# Patient Record
Sex: MALE | Race: WHITE
[De-identification: names, ages, dates, MRNs, and addresses within clinical notes are randomized per-mention and may not be internally consistent; named-entity substitution may affect disease eponyms.]

---

## 2019-12-07 ENCOUNTER — HOSPITAL ENCOUNTER (EMERGENCY)
Dept: HOSPITAL 62 - ER | Age: 24
Discharge: HOME | End: 2019-12-07
Payer: OTHER GOVERNMENT

## 2019-12-07 VITALS — SYSTOLIC BLOOD PRESSURE: 126 MMHG | DIASTOLIC BLOOD PRESSURE: 57 MMHG

## 2019-12-07 DIAGNOSIS — K92.0: Primary | ICD-10-CM

## 2019-12-07 DIAGNOSIS — F17.200: ICD-10-CM

## 2019-12-07 LAB
ADD MANUAL DIFF: NO
ALBUMIN SERPL-MCNC: 4.7 G/DL (ref 3.5–5)
ALP SERPL-CCNC: 64 U/L (ref 38–126)
ANION GAP SERPL CALC-SCNC: 13 MMOL/L (ref 5–19)
APPEARANCE UR: CLEAR
APTT PPP: (no result) S
AST SERPL-CCNC: 30 U/L (ref 17–59)
BASOPHILS # BLD AUTO: 0 10^3/UL (ref 0–0.2)
BASOPHILS NFR BLD AUTO: 0.5 % (ref 0–2)
BILIRUB DIRECT SERPL-MCNC: 0.1 MG/DL (ref 0–0.4)
BILIRUB SERPL-MCNC: 0.5 MG/DL (ref 0.2–1.3)
BILIRUB UR QL STRIP: NEGATIVE
BUN SERPL-MCNC: 11 MG/DL (ref 7–20)
CALCIUM: 9.7 MG/DL (ref 8.4–10.2)
CHLORIDE SERPL-SCNC: 101 MMOL/L (ref 98–107)
CO2 SERPL-SCNC: 26 MMOL/L (ref 22–30)
EOSINOPHIL # BLD AUTO: 0.1 10^3/UL (ref 0–0.6)
EOSINOPHIL NFR BLD AUTO: 1 % (ref 0–6)
ERYTHROCYTE [DISTWIDTH] IN BLOOD BY AUTOMATED COUNT: 12.2 % (ref 11.5–14)
GLUCOSE SERPL-MCNC: 76 MG/DL (ref 75–110)
GLUCOSE UR STRIP-MCNC: NEGATIVE MG/DL
HCT VFR BLD CALC: 43.3 % (ref 37.9–51)
HGB BLD-MCNC: 15.2 G/DL (ref 13.5–17)
INR PPP: 1.14
KETONES UR STRIP-MCNC: (no result) MG/DL
LYMPHOCYTES # BLD AUTO: 2.8 10^3/UL (ref 0.5–4.7)
LYMPHOCYTES NFR BLD AUTO: 34.4 % (ref 13–45)
MCH RBC QN AUTO: 32 PG (ref 27–33.4)
MCHC RBC AUTO-ENTMCNC: 35.1 G/DL (ref 32–36)
MCV RBC AUTO: 91 FL (ref 80–97)
MONOCYTES # BLD AUTO: 0.4 10^3/UL (ref 0.1–1.4)
MONOCYTES NFR BLD AUTO: 5.2 % (ref 3–13)
NEUTROPHILS # BLD AUTO: 4.7 10^3/UL (ref 1.7–8.2)
NEUTS SEG NFR BLD AUTO: 58.9 % (ref 42–78)
PH UR STRIP: 7 [PH] (ref 5–9)
PLATELET # BLD: 318 10^3/UL (ref 150–450)
POTASSIUM SERPL-SCNC: 4.7 MMOL/L (ref 3.6–5)
PROT SERPL-MCNC: 7.7 G/DL (ref 6.3–8.2)
PROT UR STRIP-MCNC: NEGATIVE MG/DL
PROTHROMBIN TIME: 14.7 SEC (ref 11.4–15.4)
RBC # BLD AUTO: 4.75 10^6/UL (ref 4.35–5.55)
SP GR UR STRIP: 1.01
TOTAL CELLS COUNTED % (AUTO): 100 %
UROBILINOGEN UR-MCNC: NEGATIVE MG/DL (ref ?–2)
WBC # BLD AUTO: 8 10^3/UL (ref 4–10.5)

## 2019-12-07 PROCEDURE — 85610 PROTHROMBIN TIME: CPT

## 2019-12-07 PROCEDURE — 80053 COMPREHEN METABOLIC PANEL: CPT

## 2019-12-07 PROCEDURE — 85025 COMPLETE CBC W/AUTO DIFF WBC: CPT

## 2019-12-07 PROCEDURE — 99283 EMERGENCY DEPT VISIT LOW MDM: CPT

## 2019-12-07 PROCEDURE — 71046 X-RAY EXAM CHEST 2 VIEWS: CPT

## 2019-12-07 PROCEDURE — 83690 ASSAY OF LIPASE: CPT

## 2019-12-07 PROCEDURE — 36415 COLL VENOUS BLD VENIPUNCTURE: CPT

## 2019-12-07 PROCEDURE — 81001 URINALYSIS AUTO W/SCOPE: CPT

## 2019-12-07 NOTE — ER DOCUMENT REPORT
ED Medical Screen (RME)





- General


Chief Complaint: Vomiting


Stated Complaint: VOMITING BLOOD


Time Seen by Provider: 12/07/19 20:08


Mode of Arrival: Ambulatory


Information source: Parent


Notes: 





24-year-old male presented to ED for complaint of nausea vomiting and vomited a 

large amount of food and then blood.  He states he drank last night and then 

drank for 5 beer over 2-hour.  Today.  He states he threw up around 3 or 4:00 

but his wife did get off till 6.  Wife stated that she called the nurse advice 

line and they told him to come right to the emergency room.  Patient states he 

drinks 2-3 times a week about 8 beer each time.  He states he smokes a pack of 

cigarettes a day he states he does not any illicit drugs.  He is a active duty 

[employer].

















I have greeted and performed a rapid initial assessment of this patient.  A 

comprehensive ED assessment and evaluation of the patient, analysis of test 

results and completion of medical decision making process will be conducted by 

an additional ED providers.





Physical Exam





- Vital signs


Vitals: 





                                        











Temp Pulse Resp BP Pulse Ox


 


 98.3 F   73   14   128/68 H  99 


 


 12/07/19 20:03  12/07/19 20:03  12/07/19 20:03  12/07/19 20:03  12/07/19 20:03














Course





- Vital Signs


Vital signs: 





                                        











Temp Pulse Resp BP Pulse Ox


 


 98.3 F   73   14   128/68 H  99 


 


 12/07/19 20:03  12/07/19 20:03  12/07/19 20:03  12/07/19 20:03  12/07/19 20:03

## 2019-12-07 NOTE — RADIOLOGY REPORT (SQ)
EXAM DESCRIPTION: 



XR CHEST 2 VIEWS



COMPLETED DATE/TME:  12/07/2019 21:46



CLINICAL HISTORY: 



24 years, Male, hematemesis, epigastric



COMPARISON:

None.



NUMBER OF VIEWS:

Two



TECHNIQUE:

Frontal and lateral radiographs of the chest were obtained



LIMITATIONS:

None.



FINDINGS:



Cardiac and mediastinal contours are normal in appearance. Lungs

are clear. No pleural effusion or pneumothorax. However, there

are subacute fractures involving the right lateral seventh,

eighth, and ninth ribs.



IMPRESSION:



No acute disease.

 



copyright 2011 [employer]- All Rights Reserved

## 2019-12-07 NOTE — ER DOCUMENT REPORT
ED General





- General


Chief Complaint: Vomiting


Stated Complaint: VOMITING BLOOD


Time Seen by Provider: 12/07/19 20:08


Mode of Arrival: Ambulatory





- [facility]


Notes: 





Patient is a 24-year-old male with no significant past medical history presents 

to the ED with his wife with concern that he had an episode of vomiting today 

that had blood in it.  Wife states that he has been drinking last night and 

today.  He normally drinks about 8 beers a few times a week.  Patient states 

that he retched pretty hard during this incident and had normal food come up and

then a small amount of blood.  This occurred at 4:30 PM today.  Hematemesis has 

not recurred since then.  He has been drinking water and eating since then 

without difficulty.  He is urinating normally.  No melena or hematochezia.  

Patient states that he has no concerns at this time and is ready to go home.  

Denies any headache, fever, neck pain, URI, sore throat, chest pain, 

palpitations, syncope, cough, shortness of breath, wheeze, dyspnea, abdominal 

pain, diarrhea, urinary retention, dysuria, hematuria, or rash.





- Related Data


Allergies/Adverse Reactions: 


                                        





No Known Allergies Allergy (Verified 12/07/19 20:15)


   











Past Medical History





- General


Information source: Parent





- Social History


Smoking Status: Current Every Day Smoker


Frequency of alcohol use: 1-2x weekly


Family History: Reviewed & Not Pertinent


Patient has suicidal ideation: No


Patient has homicidal ideation: No





Review of Systems





- Review of Systems


-: Yes All other systems reviewed and negative





Physical Exam





- Vital signs


Vitals: 


                                        











Temp Pulse Resp BP Pulse Ox


 


 98.3 F   73   14   128/68 H  99 


 


 12/07/19 20:03  12/07/19 20:03  12/07/19 20:03  12/07/19 20:03  12/07/19 20:03














- Notes


Notes: 





PHYSICAL EXAMINATION:





GENERAL: Well-appearing, well-nourished and in no acute distress.





HEAD: Atraumatic, normocephalic.





EYES: Pupils equal round and reactive to light, extraocular movements intact, 

sclera anicteric, conjunctiva are normal.





ENT: Nares patent and without discharge.  oropharynx clear without exudates.  No

tonsilar hypertrophy or erythema.  Moist mucous membranes. 





NECK: Normal range of motion, supple without lymphadenopathy





LUNGS: Breath sounds clear to auscultation bilaterally and equal.  No wheezes 

rales or rhonchi.





HEART: Regular rate and rhythm without murmurs, rubs, gallops.





ABDOMEN: Soft,  nondistended abdomen.  No guarding, no rebound.   Normal bowel 

sounds present.  No CVA tenderness bilaterally.





Musculoskeletal: FROM to passive/active. Strength 5+/5. 





Extremities:  No cyanosis, clubbing, or edema b/l.  Peripheral pulses 2+.  

Capillary refill less than 3 seconds.





NEUROLOGICAL: Normal speech, normal gait. 





PSYCH: Normal mood, normal affect.





SKIN: Warm, Dry, normal turgor, no rashes or lesions noted.





Course





- Re-evaluation


Re-evalutation: 





12/07/19 22:07


Patient is an afebrile, well-hydrated, 24-year-old male who presents with one 

episode of nausea and vomiting this afternoon about 6 hours ago with small 

amount of red blood/hematemesis noted.  Vitals are acceptable without 

significant tachycardia, tachypnea, hypoxia.  PE is otherwise unremarkable.  

Patient is nontoxic-appearing and is tolerating p.o. without difficulty.  He is 

otherwise asymptomatic.  Abdomen is soft and nontender throughout.  He is not 

having any chest pain, shortness breath, or dyspnea on exertion.  Chest x-ray 

unremarkable.  Labs are also unremarkable.  I do suspect that this could be 

gastritis versus Rhianna-Wagner tear.  Low suspicion/risk for pneumothorax, 

esophageal rupture, esophageal variceal bleed, acute appendicitis, bowel 

obstruction, acute cholecystitis, perforated diverticulitis, incarcerated 

hernia, pancreatitis, perforated ulcer, peritonitis, sepsis, or other systemic 

emergent condition at this time.  Patient is aware that his condition can change

from initial presentation and he needs to monitor symptoms closely and seek 

medical attention if any acute changes.  Conservative measures otherwise for 

symptoms.  Recheck with PCM in 3-5 days.  Schedule consult with a 

gastroenterologist.  Alcohol cessation reviewed.  Return to the ED with any 

worsening/concerning symptoms otherwise as reviewed in discharge.  Patient is in

agreement.





- Vital Signs


Vital signs: 


                                        











Temp Pulse Resp BP Pulse Ox


 


 98.3 F   73   14   128/68 H  99 


 


 12/07/19 20:16  12/07/19 20:03  12/07/19 20:16  12/07/19 20:03  12/07/19 20:16














- Laboratory


Result Diagrams: 


                                 12/07/19 20:24





                                 12/07/19 20:24


Laboratory results interpreted by me: 


                                        











  12/07/19





  21:03


 


Urine Ketones  TRACE H














Discharge





- Discharge


Clinical Impression: 


 Hematemesis of unknown cause





Nausea and vomiting


Qualifiers:


 Vomiting type: unspecified Vomiting Intractability: non-intractable Qualified 

Code(s): R11.2 - Nausea with vomiting, unspecified





Condition: Stable


Disposition: HOME, SELF-CARE


Additional Instructions: 


Maintain adequate fluid and food intake


[place] diet ([employer] Bananas, rice, apples, toast, etc


Zofran as needed


tylenol if needed


Avoid alcohol consumption


Monitor for any worsening symptoms


Make sure you are staying hydrated enough to urinate and have normal BM's


Recheck with your PCM in 3-5 days


Consider consult with Gastroenterology for ongoing/worsening symptoms


Return to the ED with any worsening symptoms and/or development of fever, 

headache, chest pain, palpitations, syncope, shortness of breath, trouble 

breathing, abdominal pain, n/v/d, blood in stool/urine, weakness, or other 

worsening symptoms that are concerning to you.  


Prescriptions: 


Omeprazole 20 mg PO DAILY #30 tablet.


Ondansetron [Zofran Odt 4 mg Tablet] 1 - 2 tab PO Q4H PRN #15 tab.rapdis


 PRN Reason: For Nausea/Vomiting


Forms:  Elevated Blood Pressure


Referrals: 


GURPREET NORTON MD [ACTIVE STAFF] - Follow up as needed